# Patient Record
Sex: FEMALE | Race: WHITE | Employment: FULL TIME | URBAN - METROPOLITAN AREA
[De-identification: names, ages, dates, MRNs, and addresses within clinical notes are randomized per-mention and may not be internally consistent; named-entity substitution may affect disease eponyms.]

---

## 2021-08-12 PROBLEM — O00.102 RUPTURED LEFT TUBAL ECTOPIC PREGNANCY CAUSING HEMOPERITONEUM: Status: ACTIVE | Noted: 2021-08-12

## 2021-08-12 PROBLEM — Z98.890 STATUS POST LAPAROSCOPY: Status: ACTIVE | Noted: 2021-08-12

## 2022-05-24 PROBLEM — O99.342 DEPRESSION AFFECTING PREGNANCY IN SECOND TRIMESTER, ANTEPARTUM: Status: ACTIVE | Noted: 2022-05-24

## 2022-08-15 PROBLEM — Z78.9 ADMITTED TO LABOR AND DELIVERY: Status: ACTIVE | Noted: 2022-08-15

## 2022-11-10 PROBLEM — O16.3 ELEVATED BLOOD PRESSURE AFFECTING PREGNANCY IN THIRD TRIMESTER, ANTEPARTUM: Status: ACTIVE | Noted: 2022-11-10

## 2022-11-16 PROBLEM — Z3A.38 38 WEEKS GESTATION OF PREGNANCY: Status: ACTIVE | Noted: 2022-11-16

## 2023-06-01 ENCOUNTER — TELEPHONE (OUTPATIENT)
Dept: CARDIOLOGY CLINIC | Age: 23
End: 2023-06-01

## 2023-06-01 NOTE — TELEPHONE ENCOUNTER
Left message for patient requesting a return call to schedule a consult with Zaki for syncope and collapse per referral from [facility].

## 2023-06-06 ENCOUNTER — INITIAL CONSULT (OUTPATIENT)
Dept: CARDIOLOGY CLINIC | Age: 23
End: 2023-06-06

## 2023-06-06 ENCOUNTER — NURSE ONLY (OUTPATIENT)
Dept: CARDIOLOGY CLINIC | Age: 23
End: 2023-06-06

## 2023-06-06 VITALS
WEIGHT: 115 LBS | DIASTOLIC BLOOD PRESSURE: 80 MMHG | HEIGHT: 62 IN | HEART RATE: 75 BPM | BODY MASS INDEX: 21.16 KG/M2 | SYSTOLIC BLOOD PRESSURE: 108 MMHG

## 2023-06-06 DIAGNOSIS — Z01.810 PRE-OPERATIVE CARDIOVASCULAR EXAMINATION: Primary | ICD-10-CM

## 2023-06-06 DIAGNOSIS — O16.3 ELEVATED BLOOD PRESSURE AFFECTING PREGNANCY IN THIRD TRIMESTER, ANTEPARTUM: ICD-10-CM

## 2023-06-06 DIAGNOSIS — R00.2 PALPITATIONS: ICD-10-CM

## 2023-06-06 DIAGNOSIS — R00.0 TACHYCARDIA: Primary | ICD-10-CM

## 2023-06-06 DIAGNOSIS — I95.1 ORTHOSTATIC HYPOTENSION: ICD-10-CM

## 2023-06-06 DIAGNOSIS — R42 DIZZINESS: ICD-10-CM

## 2023-06-06 RX ORDER — VENLAFAXINE HYDROCHLORIDE 75 MG/1
75 CAPSULE, EXTENDED RELEASE ORAL DAILY
COMMUNITY

## 2023-06-06 RX ORDER — FERROUS SULFATE 325(65) MG
325 TABLET ORAL
COMMUNITY

## 2023-06-06 NOTE — PATIENT INSTRUCTIONS
Please be informed that if you contact our office outside of normal business hours the physician on call cannot help with any scheduling or rescheduling issues, procedure instruction questions or any type of medication issue. We advise you for any urgent/emergency that you go to the nearest emergency room! PLEASE CALL OUR OFFICE DURING NORMAL BUSINESS HOURS    Monday - Friday   8 am to 5 pm    [place] 12: [phone]    [place]:  [address] Laboratory Locations - No appointment necessary. Sites open Monday to Friday. Call your preferred location for test preparation, business   hours and other information you need. [employer]. [address] [place], [address] [facility]  Phone: [phone] [place]  [address] [facility]  [address] [place], [address] [facility]  Phone: [phone]     **It is YOUR responsibilty to bring medication bottles and/or updated medication list to [address] [place] [address]. This will allow us to better serve you and all your healthcare needs**  Thank you for allowing us to care for you today! We want to ensure we can follow your treatment plan and we strive to give you the best outcomes and experience possible. If you ever have a life threatening emergency and call 911 - for an ambulance (EMS)   Our providers can only care for you at:   [facility] or [facility]. Even if you have someone take you or you drive yourself we can only care for you in a [facility] facility. Our providers are not setup at the other healthcare locations!

## 2023-06-06 NOTE — PROGRESS NOTES
30 days e-cardio monitor placed.  # [id number]. Instructed patient on how to record the event and to call monitoring center at [phone] if any problems arise. Instructed patient to disconnect the lead wires from the electrodes before bathing or showering and reattach them afterwards. Instructed patient that the electrodes should be changed every 3 days or if they no longer adhere to the skin. Patient to mail package after the monitor has ended. Patient verbalized understanding.

## 2023-06-06 NOTE — ASSESSMENT & PLAN NOTE
She is getting dizzy lightheaded we will have her evaluated with a tilt table test we will also get a 30-day monitor due to ongoing palpitations

## 2023-06-06 NOTE — ASSESSMENT & PLAN NOTE
She is quite symptomatic and has multiple complaints including chest pain dizziness lightheadedness which could be related to anxiety fibromyalgia but will get treadmill to see if he can unmask any arrhythmia also get echo to rule out structural heart disease we will get a tilt table test due to her concerns.   We will get TSH

## 2023-06-06 NOTE — PROGRESS NOTES
CARDIOLOGY  NOTE    Chief Complaint: Recurrent syncope    HPI:   Nelia Harp is a 23y.o. year old who has Past medical history as noted below. She comes in for evaluation due to ongoing symptoms of feeling lightheaded dizziness and multitude of other complaints she is post pregnancy 6 months during pregnancy she developed preeclampsia with blood pressure up to 190 / 76 since then her blood pressures come down if but she is concerned that she may have POTS   Head feels full move eyes feesl a rush through her head has tremors in hands and legs   She has had problesm since she was 11, she was diagnosed of heart problem and anxiety and fibromyalgia , she thinks she has EDS with POTS   She is waiting to see psych  She went up on efffexor which has helped but she is feeling tremors her body is shaking as she has not been able to get any of her Effexor for last 2 to 3 days due to insurance issues    Current Outpatient Medications   Medication Sig Dispense Refill    venlafaxine (EFFEXOR XR) 75 MG extended release capsule Take 1 capsule by mouth daily      ferrous sulfate (IRON 325) 325 (65 Fe) MG tablet Take 1 tablet by mouth daily (with breakfast)       No current facility-administered medications for this visit. Allergies:   Latex;  [employer] liquid bandage [dermatological products, misc.]; and Hydrogen peroxide    Patient History:  Past Medical History:   Diagnosis Date    Asthma     Bipolar 1 disorder ([address]     Fibromyalgia     Gestational hypertension      Past Surgical History:   Procedure Laterality Date     SECTION N/A 2022     SECTION performed by Karan Vo MD at [facility] L&D OR    ECTOPIC PREGNANCY SURGERY N/A 2021    ECTOPIC PREGNANCY EXCISION LAPAROSCOPIC performed by Scott Cooper MD at [facility] OR     Family History   Problem Relation Age of Onset    Cancer Other         grandmother    Diabetes Other         great grandparents

## 2023-06-07 ENCOUNTER — TELEPHONE (OUTPATIENT)
Dept: CARDIOLOGY CLINIC | Age: 23
End: 2023-06-07

## 2023-06-08 NOTE — TELEPHONE ENCOUNTER
[facility]YSICAL REHABILITATION [place]     Dr. Sherwood Gave     Tilt Table Procedure     Patient Name: Samira Rios  JXC:[id number]  ETO#[id number]    Date of Procedure: 6/14/23  Time: 10:30am   Arrival Time: 9:30am       Hospital: [facility])     X   If you have received orders for blood work please have it done on assigned date at [facility], [facility], or [facility]. X Please do not have anything by mouth after midnight prior to or 8 hours before the procedure. X You will need to arrive at the hospital 1 hour prior to the procedure. When you arrive at the hospital please go to the registration desk. X You will need to arrange for someone to drive you home after the procedure. X Please wear comfortable clothing for the procedure.

## 2023-06-08 NOTE — TELEPHONE ENCOUNTER
Patient given instructions over telephone on Tilt Table for Dx: Syncope. Procedure is scheduled for 6/14/23 @ 10:30am, w/arrival @ 9:30am, @ [address] [place] [address]. Pre-admission orders are in [facility] for labwork and/or CXR, which are due 6/9/23 @ [address] [facility]. Patient advised to review instructions given. Patient was notified that procedure date or time could be changed due to an emergency. Patient voiced understanding.

## 2023-06-09 ENCOUNTER — HOSPITAL ENCOUNTER (OUTPATIENT)
Age: 23
Discharge: HOME | End: 2023-06-09
Payer: COMMERCIAL

## 2023-06-09 DIAGNOSIS — Z01.810 PRE-OPERATIVE CARDIOVASCULAR EXAMINATION: ICD-10-CM

## 2023-06-09 DIAGNOSIS — R00.0 TACHYCARDIA: ICD-10-CM

## 2023-06-09 DIAGNOSIS — O16.3 ELEVATED BLOOD PRESSURE AFFECTING PREGNANCY IN THIRD TRIMESTER, ANTEPARTUM: ICD-10-CM

## 2023-06-09 LAB
ANION GAP SERPL CALCULATED.3IONS-SCNC: 13 MMOL/L (ref 4–16)
BUN SERPL-MCNC: 10 MG/DL (ref 6–23)
CALCIUM SERPL-MCNC: 9.6 MG/DL (ref 8.3–10.6)
CHLORIDE BLD-SCNC: 103 MMOL/L (ref 99–110)
CO2: 22 MMOL/L (ref 21–32)
CREAT SERPL-MCNC: 0.7 MG/DL (ref 0.6–1.1)
GFR SERPL CREATININE-BSD FRML MDRD: >60 ML/MIN/1.73M2
GLUCOSE SERPL-MCNC: 102 MG/DL (ref 70–99)
MAGNESIUM: 1.9 MG/DL (ref 1.8–2.4)
POTASSIUM SERPL-SCNC: 3.8 MMOL/L (ref 3.5–5.1)
SODIUM BLD-SCNC: 138 MMOL/L (ref 135–145)
TSH SERPL DL<=0.005 MIU/L-ACNC: 0.39 UIU/ML (ref 0.27–4.2)

## 2023-06-09 PROCEDURE — 84443 ASSAY THYROID STIM HORMONE: CPT

## 2023-06-09 PROCEDURE — 36415 COLL VENOUS BLD VENIPUNCTURE: CPT

## 2023-06-09 PROCEDURE — 83735 ASSAY OF MAGNESIUM: CPT

## 2023-06-09 PROCEDURE — 80048 BASIC METABOLIC PNL TOTAL CA: CPT

## 2023-06-19 ENCOUNTER — PROCEDURE VISIT (OUTPATIENT)
Dept: CARDIOLOGY CLINIC | Age: 23
End: 2023-06-19
Payer: COMMERCIAL

## 2023-06-19 DIAGNOSIS — O16.3 ELEVATED BLOOD PRESSURE AFFECTING PREGNANCY IN THIRD TRIMESTER, ANTEPARTUM: ICD-10-CM

## 2023-06-19 DIAGNOSIS — R00.0 TACHYCARDIA: ICD-10-CM

## 2023-06-19 LAB
LV EF: 58 %
LVEF MODALITY: NORMAL

## 2023-06-19 PROCEDURE — 93306 TTE W/DOPPLER COMPLETE: CPT | Performed by: INTERNAL MEDICINE

## 2023-06-19 PROCEDURE — 93015 CV STRESS TEST SUPVJ I&R: CPT | Performed by: INTERNAL MEDICINE

## 2023-06-22 ENCOUNTER — TELEPHONE (OUTPATIENT)
Dept: CARDIOLOGY CLINIC | Age: 23
End: 2023-06-22

## 2023-06-22 NOTE — TELEPHONE ENCOUNTER
Summary   Left ventricular function is normal, EF 55-60%. Mild tricuspid and pulmonic regurgitation. Normal pulmonary artery pressure, RVSP 16 mmHg. No regional wall motion abnormalities were detected. No evidence of diastolic dysfunction. No evidence of pericardial effusion.    tried to reach pt regarding results pt has no VM

## 2023-07-20 ENCOUNTER — TELEPHONE (OUTPATIENT)
Dept: CARDIOLOGY CLINIC | Age: 23
End: 2023-07-20

## 2023-07-20 NOTE — TELEPHONE ENCOUNTER
----- Message from Jose Acharya MD sent at 7/19/2023  4:57 PM EDT -----  Monitor worn from 6 June 2020 23-5 July 2023 with 11 patient triggers and 3 auto triggers lowest heart rate was 55 at 3:55 AM average heart rate was 89 highest heart rate 181 at 3:31 PM.  Patient reported symptoms of shortness of breath lightheadedness and dizziness was associated with sinus rhythm with a heart rate of 99 and in sinus tachycardia with heart rate 135 no sustained arrhythmias or abnormality noted but frequent episodes of tachycardia noted.   Reported symptoms of feeling dizziness lightheadedness correlated with sinus tachycardia and possible atrial tachycardia runs with heart rate in 140s to 150s clinical correlation needed

## 2023-07-20 NOTE — TELEPHONE ENCOUNTER
Called patient to schedule apt with Vanessa to go over monitor results. No ans, LM for patient to call back.

## 2023-07-26 ENCOUNTER — TELEPHONE (OUTPATIENT)
Dept: CARDIOLOGY CLINIC | Age: 23
End: 2023-07-26

## 2023-07-26 NOTE — TELEPHONE ENCOUNTER
Patient called and message left for patient to return call. Pt needing apt with Dr. Rayshawn Sinclair to go over event monitor.